# Patient Record
(demographics unavailable — no encounter records)

---

## 2025-02-25 NOTE — HISTORY OF PRESENT ILLNESS
[FreeTextEntry1] : acne  [de-identified] : Ms. JESSE SANCHEZ is a 52 year old F here for evaluation of below   #Acne on face, xyears.  Prev on Accutane as teenager with signif improvement. Flaring over past 5 years.  Getting irregular periods (skips months), perimenopausal.  - 11/2023: started tretinoin 0.05% and spironolactone 100mg - 2/2025: stopped spironolactone 6 months ago, overall maintaining with minor flares. 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

## 2025-02-25 NOTE — PHYSICAL EXAM
[Alert] : alert [Oriented x 3] : ~L oriented x 3 [Well Nourished] : well nourished [Conjunctiva Non-injected] : conjunctiva non-injected [No Visual Lymphadenopathy] : no visual  lymphadenopathy [No Clubbing] : no clubbing [No Edema] : no edema [No Bromhidrosis] : no bromhidrosis [No Chromhidrosis] : no chromhidrosis [FreeTextEntry3] : Focused exam only (see below) per patient request:  1 cystic small papule R chin

## 2025-02-25 NOTE — ASSESSMENT
[FreeTextEntry1] : #Cystic hormonal acne - chronic; minor exacerbation. Overall stable off spironolactone - Diagnosis, chronic nature, disease course, treatment options and goals of therapy discussed - Increase use of tretinoin 0.05% (pea-sized amount) to nightly to face. [place] technique discussed. Moisturize for dryness. Discontinue use during pregnancy  - Pt prefers to stay off magno  #Rhytides - stable - Tretinoin as above   RTC 1 yr

## 2025-03-08 NOTE — HISTORY OF PRESENT ILLNESS
[FreeTextEntry1] :  INCOMPLETE NOTE for 3/12/25 Ms. JESSE SANCHEZ 52 year - old F is here Mar 12, 2025 to establish care in the [facility].